# Patient Record
Sex: FEMALE | Race: WHITE | NOT HISPANIC OR LATINO
[De-identification: names, ages, dates, MRNs, and addresses within clinical notes are randomized per-mention and may not be internally consistent; named-entity substitution may affect disease eponyms.]

---

## 2020-03-25 ENCOUNTER — TRANSCRIPTION ENCOUNTER (OUTPATIENT)
Age: 30
End: 2020-03-25

## 2022-10-31 ENCOUNTER — NON-APPOINTMENT (OUTPATIENT)
Age: 32
End: 2022-10-31

## 2023-08-29 ENCOUNTER — APPOINTMENT (OUTPATIENT)
Dept: ORTHOPEDIC SURGERY | Facility: CLINIC | Age: 33
End: 2023-08-29
Payer: COMMERCIAL

## 2023-08-29 VITALS — HEIGHT: 63 IN | BODY MASS INDEX: 22.15 KG/M2 | WEIGHT: 125 LBS

## 2023-08-29 DIAGNOSIS — R20.2 PARESTHESIA OF SKIN: ICD-10-CM

## 2023-08-29 PROCEDURE — 99203 OFFICE O/P NEW LOW 30 MIN: CPT

## 2023-08-29 PROCEDURE — 73110 X-RAY EXAM OF WRIST: CPT | Mod: 50

## 2023-08-29 NOTE — DISCUSSION/SUMMARY
[de-identified] : - discussed etiology/ pathophysiology of her complaints in laymen's terms - reviewed treatment options, conservative and operative - reviewed r/b/a to these - NCS/ EMG - AAOS exercises provided - NSAIDs prn pain - f/u after EMG

## 2023-08-29 NOTE — IMAGING
[de-identified] : Full/painless neck ROM  Bilateral hands Skin atraumatic No atrophy Full finger range of motion SILT in radial/ulnar distributions, diminished in median distribution + AIN/PIN/Ulnar n. - Tinel at wrist + Phalen's

## 2023-08-29 NOTE — HISTORY OF PRESENT ILLNESS
[de-identified] : 08/29/2023  SANDY 32 year F is here for Location: Bilateral Wrist  Injury: States a patient has sprain her wrist 2017. States in 2020 she had flu shot had numbness, states since than she had having weird feeling on the Bilateral wrist and radiating in her Bilateral Hands, has been having pins and needles feeling for frequently. states seeing nuero but no one can telling what's going on. As well states having swelling and unable to hold certain items  Date of Injury: 2017/2020  Prior treatments: none Hand dominance: Right Occupation: Stroke Tele Nurse

## 2023-08-31 ENCOUNTER — APPOINTMENT (OUTPATIENT)
Dept: NEUROLOGY | Facility: CLINIC | Age: 33
End: 2023-08-31
Payer: COMMERCIAL

## 2023-08-31 PROCEDURE — 95913 NRV CNDJ TEST 13/> STUDIES: CPT

## 2023-08-31 PROCEDURE — 95886 MUSC TEST DONE W/N TEST COMP: CPT

## 2023-09-13 ENCOUNTER — APPOINTMENT (OUTPATIENT)
Dept: ORTHOPEDIC SURGERY | Facility: CLINIC | Age: 33
End: 2023-09-13

## 2024-03-08 ENCOUNTER — APPOINTMENT (OUTPATIENT)
Dept: ORTHOPEDIC SURGERY | Facility: CLINIC | Age: 34
End: 2024-03-08
Payer: OTHER MISCELLANEOUS

## 2024-03-08 VITALS — BODY MASS INDEX: 22.15 KG/M2 | WEIGHT: 125 LBS | HEIGHT: 63 IN

## 2024-03-08 DIAGNOSIS — Z78.9 OTHER SPECIFIED HEALTH STATUS: ICD-10-CM

## 2024-03-08 PROCEDURE — 99204 OFFICE O/P NEW MOD 45 MIN: CPT

## 2024-03-08 PROCEDURE — 72170 X-RAY EXAM OF PELVIS: CPT

## 2024-03-08 PROCEDURE — 72110 X-RAY EXAM L-2 SPINE 4/>VWS: CPT

## 2024-03-08 RX ORDER — CYCLOBENZAPRINE HYDROCHLORIDE 5 MG/1
5 TABLET, FILM COATED ORAL
Qty: 45 | Refills: 1 | Status: ACTIVE | COMMUNITY
Start: 2024-03-08 | End: 1900-01-01

## 2024-03-08 RX ORDER — NAPROXEN 500 MG/1
500 TABLET ORAL
Qty: 60 | Refills: 0 | Status: ACTIVE | COMMUNITY
Start: 2024-03-08 | End: 1900-01-01

## 2024-03-08 NOTE — WORK
[Sprain/Strain] : sprain/strain [Was the competent medical cause of the injury] : was the competent medical cause of the injury [Consistent with my objective findings] : consistent with my objective findings [Are consistent with the injury] : are consistent with the injury [Partial] : partial [Reveals pre-existing condition(s) that may affect treatment/prognosis] : reveals pre-existing condition(s) that may affect treatment/prognosis [Patient] : patient [Rx may affect patient's ability to return to work, make patient drowsy, or other issue] : Rx may affect patient's ability to return to work, make patient drowsy, or other issue. [I actively supervised the healthcare provider named who provided these services] : I actively supervised the healthcare provider named who provided these services:  [FreeTextEntry1] : fair [FreeTextEntry2] : mild DDD

## 2024-03-08 NOTE — RETURN TO WORK/SCHOOL
[FreeTextEntry1] : Based on my current evaluation, patient must work light duty at this time. Will re-evaluate at MRI follow up.

## 2024-03-08 NOTE — HISTORY OF PRESENT ILLNESS
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 Further aggravated by transferring patients later that day. Associated radiation to bilateral thighs, with feeling of heaviness. Denies N/T. Aggravated by prolonged standing. Difficulty finding position of comfort at night.  Has tried IBU, without relief.  Denies prior lower back physical therapy/surgeries. No b/b dysfunction reported.  [] : no [FreeTextEntry3] : 03/04/2024 [FreeTextEntry5] : 34 yo F presenting with low back pain. WC injury. Was moving hospital bed & transferring pts when pain started. Pain down b/l thighs & knees.  [FreeTextEntry7] : b/l thighs

## 2024-03-08 NOTE — IMAGING
[Disc space narrowing] : Disc space narrowing [AP] : anteroposterior [There are no fractures, subluxations or dislocations. No significant abnormalities are seen] : There are no fractures, subluxations or dislocations. No significant abnormalities are seen [de-identified] : LSPINE Inspection: No rash or ecchymosis Palpation: Midline lumbar tenderness. Bilateral lumbar paraspinal muscle tenderness, no SI joint tenderness to palpation ROM: Full with stiffness Strength: 5/5 bilateral hip flexors, knee extensors, ankle dorsiflexors, EHL, ankle plantarflexors Sensation: Sensation present to light touch bilateral L2-S1 distributions Provocative maneuvers: Equivocal bilateral straight leg raise  Bilateral hips- Palpation: No tenderness to palpation over greater trochanter or IT band ROM: No pain with flexion and internal rotation [FreeTextEntry1] : mild L5-S1 DDD

## 2024-03-08 NOTE — ASSESSMENT
[FreeTextEntry1] : lumbar strain, likely with hip flexor involvement although equivocal tension signs on exam MRI L spine to eval HNP. Will be used to guide injections/surgery as needed f/u after MRI PT Patient is working light duty  Muscle Relaxants- To help decrease muscle spasm and assist with pain relief. Advised of sedating effects and instructed not to drive, operate heavy machinery, or take with other sedating medications.  Patient seen by Connie Jones PA-C, with and under the supervision of  Dr. Alistair Canales M.D.

## 2024-03-11 ENCOUNTER — APPOINTMENT (OUTPATIENT)
Dept: ORTHOPEDIC SURGERY | Facility: CLINIC | Age: 34
End: 2024-03-11
Payer: OTHER MISCELLANEOUS

## 2024-03-11 VITALS — BODY MASS INDEX: 22.75 KG/M2 | WEIGHT: 130 LBS | HEIGHT: 63.5 IN

## 2024-03-11 DIAGNOSIS — S80.02XA CONTUSION OF LEFT KNEE, INITIAL ENCOUNTER: ICD-10-CM

## 2024-03-11 DIAGNOSIS — M79.18 MYALGIA, OTHER SITE: ICD-10-CM

## 2024-03-11 DIAGNOSIS — M22.2X1 PATELLOFEMORAL DISORDERS, RIGHT KNEE: ICD-10-CM

## 2024-03-11 DIAGNOSIS — M22.2X2 PATELLOFEMORAL DISORDERS, RIGHT KNEE: ICD-10-CM

## 2024-03-11 DIAGNOSIS — S80.01XA CONTUSION OF RIGHT KNEE, INITIAL ENCOUNTER: ICD-10-CM

## 2024-03-11 PROCEDURE — 73564 X-RAY EXAM KNEE 4 OR MORE: CPT | Mod: 50

## 2024-03-11 PROCEDURE — 99204 OFFICE O/P NEW MOD 45 MIN: CPT

## 2024-03-11 RX ORDER — NAPROXEN 500 MG/1
500 TABLET ORAL TWICE DAILY
Qty: 60 | Refills: 0 | Status: ACTIVE | COMMUNITY
Start: 2024-03-11 | End: 1900-01-01

## 2024-03-11 NOTE — WORK
[Does not reveal pre-existing condition(s) that may affect treatment/prognosis] : does not reveal pre-existing condition(s) that may affect treatment/prognosis [Partial] : partial [Patient] : patient [Can return to work without limitations on ______] : can return to work without limitations on [unfilled] [I provided the services listed above] :  I provided the services listed above.

## 2024-03-11 NOTE — ASSESSMENT
[FreeTextEntry1] : Bilateral X-Ray Examination of the KNEE (4 views): there are no fractures, subluxations or dislocations.  - We discussed their diagnosis and treatment options at length including the risks and benefits of both surgical and non-surgical options. - We will continue conservative treatment with icing, anti-inflammatory medication, and PT  - The patient was provided with a prescription to work on hip ER/abductors strengthening along with quad/hamstring stretches and strengthening  - naprosyn rx - Patient was given a prescription for an anti-inflammatory medication.  They will take it for the next week and then on an as needed basis, as long as there are no medical contra-indications.  Patient is counseled on possible GI, renal, and cardiovascular side effects. - The patient was advised to let pain guide the gradual advancement of activities.  - Follow up as needed in 6 weeks to re-evaluate.

## 2024-03-11 NOTE — HISTORY OF PRESENT ILLNESS
[de-identified] : WC 3/4/24  33 year old female  [facility]  joan knees pain since 3/4/24 while moving broken beds at work on 3/4/24 and hit against the wall and while moving patients beds pain increased  The pain is located anterior, deep  The pain is associated with clicking  Worse with activity and better at rest. Has tried Motrin , activity mod

## 2024-03-11 NOTE — IMAGING
[de-identified] :  LEFT KNEE Inspection:  minimal effusion Palpation: medial facet of the patella tenderness  Knee Range of Motion:  0-135 Strength: 5/5 Quadriceps strength, 5/5 Hamstring strength, 4/5 Hip Abductor strength Neurological: light touch is intact throughout Ligament Stability and Special Tests:  McMurrays: neg Lachman: neg Pivot Shift: neg Posterior Drawer: neg Valgus: neg Varus: neg Patella Apprehension: neg Patella Maltracking: neg  RIGHT KNEE Inspection:  minimal effusion Palpation: medial facet of the patella tenderness  Knee Range of Motion:  0-135 Strength: 5/5 Quadriceps strength, 5/5 Hamstring strength, 4/5 Hip Abductor strength Neurological: light touch is intact throughout Ligament Stability and Special Tests:  McMurrays: neg Lachman: neg Pivot Shift: neg Posterior Drawer: neg Valgus: neg Varus: neg Patella Apprehension: neg Patella Maltracking: neg

## 2024-03-13 ENCOUNTER — APPOINTMENT (OUTPATIENT)
Dept: MRI IMAGING | Facility: CLINIC | Age: 34
End: 2024-03-13
Payer: OTHER MISCELLANEOUS

## 2024-03-13 PROCEDURE — 72148 MRI LUMBAR SPINE W/O DYE: CPT

## 2024-03-27 ENCOUNTER — TRANSCRIPTION ENCOUNTER (OUTPATIENT)
Age: 34
End: 2024-03-27

## 2024-03-29 ENCOUNTER — APPOINTMENT (OUTPATIENT)
Dept: ORTHOPEDIC SURGERY | Facility: CLINIC | Age: 34
End: 2024-03-29
Payer: OTHER MISCELLANEOUS

## 2024-03-29 DIAGNOSIS — M89.9 DISORDER OF BONE, UNSPECIFIED: ICD-10-CM

## 2024-03-29 DIAGNOSIS — M70.61 TROCHANTERIC BURSITIS, RIGHT HIP: ICD-10-CM

## 2024-03-29 DIAGNOSIS — S39.012A STRAIN OF MUSCLE, FASCIA AND TENDON OF LOWER BACK, INITIAL ENCOUNTER: ICD-10-CM

## 2024-03-29 PROCEDURE — 99215 OFFICE O/P EST HI 40 MIN: CPT | Mod: 25

## 2024-03-29 PROCEDURE — 20610 DRAIN/INJ JOINT/BURSA W/O US: CPT | Mod: RT

## 2024-03-29 NOTE — ASSESSMENT
[FreeTextEntry1] : lumbar strain, likely with hip flexor involvement  MRI: Mild NF narrowing L4/5  Don't see an obvious L iliac lesion, though XRs limited by bowel gas MRI pelvis w/ w/o to eval   PT for strain  Muscle Relaxants- To help decrease muscle spasm and assist with pain relief. Advised of sedating effects and instructed not to drive, operate heavy machinery, or take with other sedating medications.  Greater Trochanteric Bursitis- The risks, benefits, contents and alternatives to injection were explained in full to the patient.  Risks outlined include but are not limited to infection, bleeding, scarring, skin discoloration, temporary increase in pain, syncopal episode, failure to resolve symptoms, allergic reaction, flare reaction, permanent white skin discoloration, symptom recurrence, and elevation of blood sugar in diabetics.  Patient understood the risks.  All questions were answered.  After discussion of options, patient requested an injection.  Oral informed consent was obtained and the injection site was prepped in a sterile fashion. The mixture consisted of:   4 cc 1% lidocaine, 80 mg of Depomedrol    The *R* trochanteric bursa was palpated to determine the site of maximal tenderness overlying the greater trochanter. The needle was advanced to contact the greater trochanter. The needle was withdrawn 1-2 mm and after negative aspiration, medication was injected.  The patient tolerated the procedure without any complications.

## 2024-03-29 NOTE — HISTORY OF PRESENT ILLNESS
[Radiating] : radiating [Dull/Aching] : dull/aching [Constant] : constant [Nothing helps with pain getting better] : Nothing helps with pain getting better [de-identified] : 3/13/24 Lumbar MRI  - report noted in chart.  Ind. 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 Further aggravated by transferring patients later that day. Associated radiation to bilateral thighs, with feeling of heaviness. Denies N/T. Aggravated by prolonged standing. Difficulty finding position of comfort at night.  Has tried IBU, without relief.  Denies prior lower back physical therapy/surgeries. No b/b dysfunction reported.  3/29/24- Sxs same in low back. Having R lateral hip pain.  [] : no [FreeTextEntry3] : 03/04/2024 [FreeTextEntry7] : b/l thighs

## 2024-03-29 NOTE — WORK
[Sprain/Strain] : sprain/strain [Was the competent medical cause of the injury] : was the competent medical cause of the injury [Are consistent with the injury] : are consistent with the injury [Consistent with my objective findings] : consistent with my objective findings [Partial] : partial [Reveals pre-existing condition(s) that may affect treatment/prognosis] : reveals pre-existing condition(s) that may affect treatment/prognosis [Rx may affect patient's ability to return to work, make patient drowsy, or other issue] : Rx may affect patient's ability to return to work, make patient drowsy, or other issue. [Patient] : patient [I actively supervised the healthcare provider named who provided these services] : I actively supervised the healthcare provider named who provided these services:  [FreeTextEntry2] : mild DDD [FreeTextEntry1] : fair

## 2024-03-29 NOTE — IMAGING
[Disc space narrowing] : Disc space narrowing [AP] : anteroposterior [There are no fractures, subluxations or dislocations. No significant abnormalities are seen] : There are no fractures, subluxations or dislocations. No significant abnormalities are seen [de-identified] : LSPINE Palpation: Midline lumbar tenderness. Bilateral lumbar paraspinal muscle tenderness, no SI joint tenderness to palpation ROM: Full with stiffness Strength: 5/5 bilateral hip flexors, knee extensors, ankle dorsiflexors, EHL, ankle plantarflexors Sensation: Sensation present to light touch bilateral L2-S1 distributions Provocative maneuvers: Equivocal bilateral straight leg raise  Bilateral hips- Palpation: No tenderness to palpation over greater trochanter or IT band on left, + on the right ROM: No pain with flexion and internal rotation [FreeTextEntry1] : mild L5-S1 DDD

## 2024-07-16 ENCOUNTER — APPOINTMENT (OUTPATIENT)
Dept: ORTHOPEDIC SURGERY | Facility: CLINIC | Age: 34
End: 2024-07-16

## 2024-07-30 ENCOUNTER — APPOINTMENT (OUTPATIENT)
Dept: ORTHOPEDIC SURGERY | Facility: CLINIC | Age: 34
End: 2024-07-30

## 2025-04-12 ENCOUNTER — NON-APPOINTMENT (OUTPATIENT)
Age: 35
End: 2025-04-12

## 2025-04-13 ENCOUNTER — NON-APPOINTMENT (OUTPATIENT)
Age: 35
End: 2025-04-13